# Patient Record
(demographics unavailable — no encounter records)

---

## 2025-02-27 NOTE — HISTORY OF PRESENT ILLNESS
[FreeTextEntry1] : FU: TV, spots on face [de-identified] : KAYLAH GARZA is a 47 year old who is presenting for evaluation of:  #Irritated spots on face #Irritated spot on back, using lubriderm and keto cream #Hx of TV. improved with diflucan, takes every 6 months. Using keto cream

## 2025-02-27 NOTE — PHYSICAL EXAM
[Alert] : alert [Oriented x 3] : ~L oriented x 3 [Declined] : declined [FreeTextEntry3] : + inflamed, waxy, keratotic papule on R eyebrow and L forehead - back skin clear

## 2025-02-27 NOTE — ASSESSMENT
[FreeTextEntry1] : # ISK  - Given irritation for 2 lesions, treated with cautery. Pt tolerated it well. WCI given -treated with cautery at setting of 2.5; SED including burning, scarring, and incomplete tx. patient verbalized understanding  #TV - Can continue diflucan 200 mg PO daily x 7 days PRN; SED. - Use keto cream prn  #Eczematous derm - Continue gentle skincare  RTC for FBSE

## 2025-03-24 NOTE — ASSESSMENT
[FreeTextEntry1] : 48 yo F. with Facioscapulohumeral Muscular Dystrophy and elevated RUBY titer presenting for evaluation of leukopenia. Patient with intermittent lymphopenia and monocytopenia since 2023. Most recently: WBC 3.3 K/uL, ,  (1/30/25). Patient feels good with no constitutional symptoms.   We reviewed additional labs on her phone that showed recent negative Hepatitis B and C, along with normal immunoglobulin levels. Her Vitamin B12 has been in the low end of normal at 200-300.  Plan --r/o nutritional deficiency: Vitamin B12, MMA, Folate, Copper --Complete viral infection screen: HIV --Flow cytometry to evaluate lymphocyte subpopulations and immunophenotyping  --Will call patient with lab results and plan

## 2025-03-24 NOTE — PHYSICAL EXAM
[Normal] : affect appropriate [de-identified] : supple  [de-identified] : no palpable tenderness

## 2025-03-24 NOTE — HISTORY OF PRESENT ILLNESS
[de-identified] : 46 yo F. with Facioscapulohumeral Muscular Dystrophy and elevated RUBY titer presenting for evaluation of leukopenia.   Chart review reveals intermittent leukopenia since 2023.  WBC 3.5 K/uL,  (1/31/23) WBC 3.3 K/uL, normal differential (2/1/24) WBC 3.3 K/uL, ,  (1/30/25)  Patient found to have elevated RUBY titer (1:1280, nuclear nucleolar pattern) in 2023. States she has been asymptomatic and was evaluated by Rheumatology in 2023 and 2024 with no definitive diagnosis. Patient reports frequent diarrhea and flatulence since childhood. Had attributed symptoms to lactose and egg intolerance. Recent Celiac antibody testing was reportedly negative. States three weeks ago she eliminated meat from her diet and GI symptoms have significantly improved. Reports 70lbs intentional weight loss over the past year with Zepbound along with diet and exercise. Reports having had screening colonoscopy in 2023, two polyps were removed, negative path.   Patient with paternal history of muscular dystrophy, she recently had positive genetic testing. She experienced pelvic organ prolapse prior to having hysterectomy and abdominoplasty. Notes occasional muscle aches and pains, have not required pain medications. She feels good overall with no unexplained fevers, chills, night sweats, recurrent infections, unusual rashes or sores and chronic joint pains and aches.  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 Patient has had negative BRCA testing. Reports having pelvic sonogram every 6 months. Father Muscular dystrophy  Healthcare Maintenance: PCP is Dr. Tobar Colonoscopy 6/2023, two polyps removed Mammogram 4/2024 [facility]: 4/2024

## 2025-07-10 NOTE — SOCIAL HISTORY
[Patient advised of risk of tobacco use and smoking cessation discussed.] : Patient advised of risk of tobacco use and smoking cessation discussed. ACP

## 2025-07-14 NOTE — ASSESSMENT
[FreeTextEntry1] : 48 yo F. with Facioscapulohumeral Muscular Dystrophy and elevated RUBY titer presenting for evaluation of leukopenia. Patient with intermittent lymphopenia and monocytopenia since 2023. Most recently: WBC 3.3 K/uL, ,  (1/30/25). Workup revealed no Hepatitis B, C and HIV, no nutritional deficiency. Flow cytometry revealed few large granular T cells- non aberrant, likely reactive. She had a mild neutropenia at last visit. ANC today at 2.15. Lymphopenia at 0.80 K/uL. Patient feels good with no systemic complaints. Will repeat flow cytometry today. Will call with results and plan  7/14/25: Spoke with patient and reviewed results Flow cytometry unremarkable. No large granular T cells present Resume PCP care and return if neutropenia returns or a new cytopenia develops

## 2025-07-14 NOTE — PHYSICAL EXAM
[Normal] : affect appropriate [de-identified] : supple  [de-identified] : no calf tenderness, no edema

## 2025-07-14 NOTE — HISTORY OF PRESENT ILLNESS
[de-identified] : 46 yo F. with Facioscapulohumeral Muscular Dystrophy and elevated RUBY titer presenting for evaluation of leukopenia.   Chart review reveals intermittent leukopenia since 2023.  WBC 3.5 K/uL,  (1/31/23) WBC 3.3 K/uL, normal differential (2/1/24) WBC 3.3 K/uL, ,  (1/30/25)  Patient found to have elevated RUBY titer (1:1280, nuclear nucleolar pattern) in 2023. States she has been asymptomatic and was evaluated by Rheumatology in 2023 and 2024 with no definitive diagnosis. Patient reports frequent diarrhea and flatulence since childhood. Had attributed symptoms to lactose and egg intolerance. Recent Celiac antibody testing was reportedly negative. States three weeks ago she eliminated meat from her diet and GI symptoms have significantly improved. Reports 70lbs intentional weight loss over the past year with Zepbound along with diet and exercise. Reports having had screening colonoscopy in 2023, two polyps were removed, negative path.   Patient with paternal history of muscular dystrophy, she recently had positive genetic testing. She experienced pelvic organ prolapse prior to having hysterectomy and abdominoplasty. Notes occasional muscle aches and pains, have not required pain medications. She feels good overall with no unexplained fevers, chills, night sweats, recurrent infections, unusual rashes or sores and chronic joint pains and aches.  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 Patient has had negative BRCA testing. Reports having pelvic sonogram every 6 months. Father Muscular dystrophy  Healthcare Maintenance: PCP is Dr. Tobar Colonoscopy 6/2023, two polyps removed Mammogram 4/2024 [facility]: 4/2024 [de-identified] : 7/10/25: Patient returns for follow up. In the interim, had breast augmentation surgery 4 weeks ago. She feels good, with no complaints.

## 2025-07-14 NOTE — PHYSICAL EXAM
[Normal] : affect appropriate [de-identified] : supple  [de-identified] : no calf tenderness, no edema